# Patient Record
Sex: FEMALE | Race: WHITE | NOT HISPANIC OR LATINO | Employment: OTHER | URBAN - METROPOLITAN AREA
[De-identification: names, ages, dates, MRNs, and addresses within clinical notes are randomized per-mention and may not be internally consistent; named-entity substitution may affect disease eponyms.]

---

## 2022-01-05 ENCOUNTER — PATIENT MESSAGE (OUTPATIENT)
Dept: CARDIOLOGY | Facility: CLINIC | Age: 77
End: 2022-01-05
Payer: MEDICARE

## 2022-01-19 ENCOUNTER — OFFICE VISIT (OUTPATIENT)
Dept: CARDIOLOGY | Facility: CLINIC | Age: 77
End: 2022-01-19
Payer: MEDICARE

## 2022-01-19 VITALS
DIASTOLIC BLOOD PRESSURE: 78 MMHG | BODY MASS INDEX: 24.03 KG/M2 | HEIGHT: 64 IN | SYSTOLIC BLOOD PRESSURE: 132 MMHG | HEART RATE: 64 BPM | WEIGHT: 140.75 LBS

## 2022-01-19 DIAGNOSIS — E78.9 ABNORMAL CHOLESTEROL TEST: Primary | ICD-10-CM

## 2022-01-19 PROCEDURE — 99204 PR OFFICE/OUTPT VISIT, NEW, LEVL IV, 45-59 MIN: ICD-10-PCS | Mod: S$PBB,,, | Performed by: INTERNAL MEDICINE

## 2022-01-19 PROCEDURE — 99999 PR PBB SHADOW E&M-EST. PATIENT-LVL III: ICD-10-PCS | Mod: PBBFAC,,, | Performed by: INTERNAL MEDICINE

## 2022-01-19 PROCEDURE — 99204 OFFICE O/P NEW MOD 45 MIN: CPT | Mod: S$PBB,,, | Performed by: INTERNAL MEDICINE

## 2022-01-19 PROCEDURE — 99999 PR PBB SHADOW E&M-EST. PATIENT-LVL III: CPT | Mod: PBBFAC,,, | Performed by: INTERNAL MEDICINE

## 2022-01-19 PROCEDURE — 99213 OFFICE O/P EST LOW 20 MIN: CPT | Mod: PBBFAC,PN | Performed by: INTERNAL MEDICINE

## 2022-01-19 RX ORDER — ROSUVASTATIN CALCIUM 10 MG/1
10 TABLET, COATED ORAL NIGHTLY
COMMUNITY
Start: 2021-12-17

## 2022-01-19 NOTE — PROGRESS NOTES
"[facility]     SUBJECTIVE:     History of Present Illness:  Patient is a 76 y.o. female presents with high TG and had a heart scan showing a blockage. .     Primary Diagnosis:   1. Hypertension: none  2. DM: none  3. Nonsmoker  4. Heart disease: none ; Had high TG's and this led to a heart scan detecting calcium score and was told this was abnormal   5. Family history of early CAD: uncle  of MI at age 35; father with stroke   ROS     A. No chest pains  B. No shortness of breath; no PND or orthopnea  C. No dizziness, no syncope  D. Activity: normal with no restrictions: walks 2 miles daily without issues   Review of patient's allergies indicates:   Allergen Reactions    Sulfa (sulfonamide antibiotics)        History reviewed. No pertinent past medical history.    History reviewed. No pertinent surgical history.    History reviewed. No pertinent family history.    Social History     Tobacco Use    Smoking status: Never Smoker    Smokeless tobacco: Never Used   Substance Use Topics    Alcohol use: Not Currently    Drug use: Never        Past Hospitalization:     Home meds:  Current Outpatient Medications on File Prior to Visit   Medication Sig Dispense Refill    rosuvastatin (CRESTOR) 10 MG tablet Take 10 mg by mouth nightly.       No current facility-administered medications on file prior to visit.       Cardiac meds:  Rosuvastatin 10 mg   Vitamins         OBJECTIVE:     Vital Signs (Most Recent)  Vitals:    22 1125   BP: 132/78   BP Location: Left arm   Patient Position: Sitting   BP Method: Large (Automatic)   Pulse: 64   Weight: 63.9 kg (140 lb 12.2 oz)   Height: 5' 3.5" (1.613 m)         Physical Exam:    Neck: normal carotids, no bruits; normal JVP  Lungs :clear  Heart: RR, normal S1,S2, no murmurs, no gallops  Abd: no masses; no bruits;   Exts: normal DP and PT pulses bilaterally, no edema noted           LABS      CBC  No results found for: HGB, HCT       BMP  No results found for: NA, " K, CO2, CL, BUN, CREATININE, GLU, EGFRIFAFRICA, EGFRNONAA    BNP  No results found for: BNP    Troponin panel:   No results found for: TROPONIN      COAGS  No results found for: PT, INR, APTT      Lipid panel:  11/21: ; HDL 44; ;    No results found for: CHOL  No results found for: HDL  No results found for: LDLCALC  No results found for: TRIG  No results found for: CHOLHDL    Diagnostic Results:  None       Chart review:        ASSESSMENT/PLAN:     1. CT calcium 1393; no symptoms of CAD; family history of CAD at young age  2. Explained all this in detail  3. She is already on crestor and would just continue for now. HDL level 44 is probably genetic.            Plan: reassurance  Return as needed     Diya Vanegas MD

## 2025-05-19 ENCOUNTER — OCHSNER VIRTUAL EMERGENCY DEPARTMENT (OUTPATIENT)
Facility: CLINIC | Age: 80
End: 2025-05-19
Payer: MEDICARE

## 2025-05-19 ENCOUNTER — PATIENT OUTREACH (OUTPATIENT)
Facility: OTHER | Age: 80
End: 2025-05-19
Payer: MEDICARE

## 2025-05-19 ENCOUNTER — NURSE TRIAGE (OUTPATIENT)
Dept: ADMINISTRATIVE | Facility: CLINIC | Age: 80
End: 2025-05-19
Payer: MEDICARE

## 2025-05-19 DIAGNOSIS — R53.83 FATIGUE, UNSPECIFIED TYPE: Primary | ICD-10-CM

## 2025-05-19 DIAGNOSIS — I95.9 HYPOTENSION, UNSPECIFIED HYPOTENSION TYPE: ICD-10-CM

## 2025-05-19 NOTE — PLAN OF CARE-OVED
[facility] Emergency Department Plan of Care Note  Referral Source: Nurse On-Call                               Chief Complaint   Patient presents with    Fatigue    Hypotension     78 yo female with PMhx of HLD calls for fatigue and morning hypotension since February.. Pt states that BP at this time is 111/75. She denies having any symptoms at this time such as CP, and SOB. No fever, urinary symptoms or infectious symptoms. Does not take BP medications. Normal appetite. States BP will be noted to be 80-89 systolic . Today BP was 81/54 around 12pm yesterday BP was 89/58 at noon. HR with low BP is 74.     Recommendation: Primary Care                          Multiple PCP appts offered to patient tomorrow. She states she would like to schedule herself. Advised to stay well hydrated and eat a salty snack (like small bag of chips).. If BP decreases again today should go to ED, especially if any symptoms or lightheadedness. Otherwise PCP tomorrow..     Encounter Diagnoses   Name Primary?    Fatigue, unspecified type Yes    Hypotension, unspecified hypotension type

## 2025-05-19 NOTE — TELEPHONE ENCOUNTER
Spoke with patient.  Scheduled her for the next available appointment with Dr Vanegas.  She will follow up with her PCP prior to seeing Dr Vanegas.

## 2025-05-19 NOTE — TELEPHONE ENCOUNTER
"Speaking with pt who reports that she has been experiencing low BP in the morning with fatigue since February. Pt states that BP at this time is 111/75. She denies having any symptoms at this time such as CP, and SOB. States BP will be noted to be 80-89 systolic . Today BP was 81/54 around 12pm yesterday BP was 89/58 at noon. Dispo is to be seen by provider within 3 days    4:45 secure chat sent to [facility]    Dr. Rosa advised for pt to be seen by PCP    Appointments offered but states will go be seen by her PCP.      Reason for Disposition   [1] Fall in systolic BP > 20 mm Hg from normal AND [2] NOT feeling weak or lightheaded    Additional Information   Negative: Started suddenly after an allergic medicine, an allergic food, or bee sting   Negative: Shock suspected (e.g., cold/pale/clammy skin, too weak to stand, low BP, rapid pulse)   Negative: Difficult to awaken or acting confused (e.g., disoriented, slurred speech)   Negative: Fainted   Negative: [1] Systolic BP < 90 AND [2] feeling weak or lightheaded (e.g., woozy, feeling like they might faint)   Negative: Chest pain   Negative: Bleeding (e.g., vomiting blood, rectal bleeding or tarry stools, severe vaginal bleeding) (Exception: Fainted from sight of small amount of blood; small cut or abrasion.)   Negative: Extra heartbeats, irregular heart beating, or heart is beating very fast  (i.e., "palpitations")   Negative: Sounds like a life-threatening emergency to the triager   Negative: [1] Systolic BP < 80 AND [2] NOT feeling weak or lightheaded   Negative: Abdominal pain   Negative: Fever > 100.4 F (38.0 C)   Negative: Major surgery in the past month   Negative: [1] Drinking very little AND [2] dehydration suspected (e.g., no urine > 12 hours, very dry mouth, very lightheaded)   Negative: [1] Fall in systolic BP > 20 mm Hg from normal AND [2] feeling weak or lightheaded   Negative: Patient sounds very sick or weak to the triager   Negative: [1] Systolic BP < 90 " AND [2] NOT feeling weak or lightheaded   Negative: [1] Systolic BP  AND [2] taking blood pressure medications AND [3] feeling weak or lightheaded   Negative: [1] Systolic BP  AND [2] taking blood pressure medications AND [3] NOT feeling weak or lightheaded    Protocols used: Blood Pressure - Low-A-

## 2025-05-20 NOTE — PROGRESS NOTES
"Patient spoke with [facility]OC RN on 5/19/2025 with complaint of "reports that she has been experiencing low BP in the morning with fatigue since February. Pt states that BP at this time is 111/75. She denies having any symptoms at this time such as CP, and SOB. States BP will be noted to be 80-89 systolic . Today BP was 81/54 around 12pm yesterday BP was 89/58 at noon."    OOC RN consulted with [facility] provider, Dr. Rosa, and disposition recommended was PCP follow up  Patient offered PCP appointments but declined stated will follow up and schedule appointment.  "

## 2025-05-22 ENCOUNTER — PATIENT OUTREACH (OUTPATIENT)
Facility: OTHER | Age: 80
End: 2025-05-22
Payer: MEDICARE

## 2025-05-22 NOTE — PROGRESS NOTES
Patient was advised to contact [facility] on 5/19/25. A follow-up call was placed to assess additional needs; however, there was no answer, and a message was left requesting a return call. Assistance will be provided if the patient returns the call.

## 2025-07-19 NOTE — PROGRESS NOTES
"[facility] [address]     SUBJECTIVE:     History of Present Illness:  Patient is a 80 y.o. female presents to establish cardiac care. Seen in  for high TG. 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 She had a problem with her vision in 2024.     Primary Diagnosis:   Hypertension: none  DM: none  Smoker: none  Family history of early CAD: uncle  of MI at age 35; father with stroke   Heart disease : none  ROS  No chest pains  No shortness of breath; no PND or orthopnea  No syncope  No palpitations  Blood pressures at home: normal in the 110 range   Activity: walks daily without issues     Review of patient's allergies indicates:   Allergen Reactions    Sulfa (sulfonamide antibiotics)        No past medical history on file.    No past surgical history on file.        Past Hospitalization:         Cardiac meds:  Rosuvastatin 10 mg   Vitamins       OBJECTIVE:     Vital Signs (Most Recent)  Vitals:    25 1016   BP: (!) 144/76   Pulse: (!) 54   SpO2: 97%   Weight: 55.6 kg (122 lb 9.2 oz)   Height: 5' 3" (1.6 m)         Physical Exam:  Neck: normal carotids, no bruits; normal JVP  Lungs :clear  Heart: RR, normal S1,S2, no murmurs, no gallops  Abd: no masses; no bruits;   Exts: normal DP and PT pulses bilaterally, normal radials; no edema noted               Labs  : ; HDL 44; ;    10/23: CMP   : LDL 46, CMP normal; TSH normal   Diagnostic Results:    1.EK/25: sinus bradycardia; otherwise normal  2.Echo:   3. Stress test  4. cath  5. Calcium score: 1392   Chart review:        ASSESSMENT/PLAN:     Stroke behind eye: unclear why since her lipids are great; blood pressures are normal; not a diabetic   EKG normal with normal examination    Plan: start ASA daily 81 mg  No need for further testing  Return as needed or once a year    Diya Vanegas MD    "

## 2025-07-22 ENCOUNTER — OFFICE VISIT (OUTPATIENT)
Dept: CARDIOLOGY | Facility: CLINIC | Age: 80
End: 2025-07-22
Payer: MEDICARE

## 2025-07-22 VITALS
SYSTOLIC BLOOD PRESSURE: 144 MMHG | OXYGEN SATURATION: 97 % | DIASTOLIC BLOOD PRESSURE: 76 MMHG | HEART RATE: 54 BPM | BODY MASS INDEX: 21.71 KG/M2 | HEIGHT: 63 IN | WEIGHT: 122.56 LBS

## 2025-07-22 DIAGNOSIS — I63.9 CEREBROVASCULAR ACCIDENT (CVA), UNSPECIFIED MECHANISM: Primary | ICD-10-CM

## 2025-07-22 PROCEDURE — 93005 ELECTROCARDIOGRAM TRACING: CPT | Mod: PBBFAC,PN | Performed by: STUDENT IN AN ORGANIZED HEALTH CARE EDUCATION/TRAINING PROGRAM

## 2025-07-22 PROCEDURE — 99204 OFFICE O/P NEW MOD 45 MIN: CPT | Mod: S$PBB,,, | Performed by: INTERNAL MEDICINE

## 2025-07-22 PROCEDURE — 99213 OFFICE O/P EST LOW 20 MIN: CPT | Mod: PBBFAC,PN | Performed by: INTERNAL MEDICINE

## 2025-07-22 PROCEDURE — 99999 PR PBB SHADOW E&M-EST. PATIENT-LVL III: CPT | Mod: PBBFAC,,, | Performed by: INTERNAL MEDICINE

## 2025-07-22 RX ORDER — VITAMIN B COMPLEX
1 CAPSULE ORAL DAILY
COMMUNITY

## 2025-07-22 RX ORDER — TIMOLOL MALEATE 5 MG/ML
1 SOLUTION/ DROPS OPHTHALMIC 2 TIMES DAILY
COMMUNITY

## 2025-07-25 LAB
OHS QRS DURATION: 72 MS
OHS QTC CALCULATION: 397 MS